# Patient Record
Sex: FEMALE | Race: WHITE | NOT HISPANIC OR LATINO | Employment: UNEMPLOYED | URBAN - METROPOLITAN AREA
[De-identification: names, ages, dates, MRNs, and addresses within clinical notes are randomized per-mention and may not be internally consistent; named-entity substitution may affect disease eponyms.]

---

## 2017-03-08 ENCOUNTER — APPOINTMENT (OUTPATIENT)
Dept: WOMENS IMAGING | Facility: HOSPITAL | Age: 62
End: 2017-03-08

## 2017-03-08 PROCEDURE — 77067 SCR MAMMO BI INCL CAD: CPT | Performed by: RADIOLOGY

## 2017-11-15 ENCOUNTER — OFFICE VISIT (OUTPATIENT)
Dept: OBSTETRICS AND GYNECOLOGY | Facility: CLINIC | Age: 62
End: 2017-11-15

## 2017-11-15 VITALS
DIASTOLIC BLOOD PRESSURE: 74 MMHG | WEIGHT: 199.4 LBS | HEIGHT: 64 IN | SYSTOLIC BLOOD PRESSURE: 120 MMHG | BODY MASS INDEX: 34.04 KG/M2

## 2017-11-15 DIAGNOSIS — M85.862 OSTEOPENIA OF LEFT LOWER LEG: ICD-10-CM

## 2017-11-15 DIAGNOSIS — Z01.419 ENCOUNTER FOR GYNECOLOGICAL EXAMINATION WITHOUT ABNORMAL FINDING: ICD-10-CM

## 2017-11-15 DIAGNOSIS — Z79.890 POST-MENOPAUSE ON HRT (HORMONE REPLACEMENT THERAPY): Primary | ICD-10-CM

## 2017-11-15 DIAGNOSIS — N81.6 RECTOCELE: ICD-10-CM

## 2017-11-15 PROCEDURE — 99396 PREV VISIT EST AGE 40-64: CPT | Performed by: OBSTETRICS & GYNECOLOGY

## 2017-11-15 RX ORDER — ESTRADIOL 0.5 MG/1
0.5 TABLET ORAL DAILY
Qty: 90 TABLET | Refills: 3 | Status: SHIPPED | OUTPATIENT
Start: 2017-11-15 | End: 2018-11-15

## 2017-11-15 NOTE — PROGRESS NOTES
Chief Complaint   Patient presents with   • Gynecologic Exam     needs order for mammogram   • Med Refbuddy Hahn is a 61 y.o. year old  presenting to be seen for her annual exam.This patient has had an LSH.  She has a history of a grade 1 rectocele which has been stable.  She has no difficulty expelling stool.  She takes estradiol, 0.5 mg daily and is asymptomatic.  She has no side effects on this medication.  She has a history of minimal osteopenia of the femoral neck with her spine and hip being normal.    SCREENING TESTS    Year 2012 2016 2017   Age                         PAP                         HPV high risk                         Mammogram     benign benign                   TEDDY score                         Breast MRI                         Lipids                         Vitamin D                         Colonoscopy                         DEXA  Frax (hip/any)    Min. osteopenia                     Ovarian Screen                             She exercises regularly: yes.  She wears her seat belt: yes.  She has concerns about domestic violence: no.  She has noticed changes in height: no    GYN screening history:  · Last mammogram: was done on approximately 3/9/2017 and the result was: Birads II (Benign findings).  · Last DEXA: was done on approximately 11/10/2015 and the results were: osteopenia of the left femoral neck.    No Additional Complaints Reported    The following portions of the patient's history were reviewed and updated as appropriate:vital signs and   She  does not have any pertinent problems on file.  She  has a past surgical history that includes d&c hysteroscopy; Hysterectomy Supracervical; and Thoracotomy.  Her family history is not on file.  She  reports that she has never smoked. She has never used smokeless tobacco. She reports that she drinks alcohol. She reports  "that she does not use illicit drugs.  Current Outpatient Prescriptions   Medication Sig Dispense Refill   • estradiol (ESTRACE) 0.5 MG tablet Take 1 tablet by mouth Daily. 90 tablet 3   • BOOSTRIX 5-2.5-18.5 injection      • fluconazole (DIFLUCAN) 150 MG tablet Take 1 tablet by mouth Every Other Day. 3 tablet 0     No current facility-administered medications for this visit.      She has No Known Allergies..    Review of Systems  A comprehensive review of systems was taken.  Constitutional: negative for fever, chills, activity change, appetite change, fatigue and unexpected weight change.  Respiratory: negative  Cardiovascular: negative  Gastrointestinal: negative  Genitourinary:negative  Musculoskeletal:positive for right hip pain  Behavioral/Psych: negative       /74  Ht 64\" (162.6 cm)  Wt 199 lb 6.4 oz (90.4 kg)  LMP  (LMP Unknown)  BMI 34.23 kg/m2    Physical Exam    General:  alert; cooperative; well developed; well nourished   Skin:  No suspicious lesions seen   Thyroid: normal to inspection and palpation   Lungs:  breathing is unlabored  clear to auscultation bilaterally   Heart:  systolic murmur: early systolic 2/6, low pitch and blowing at 2nd left intercostal space  Midline scar on chest   Breasts:  Examined in supine position  Symmetric without masses or skin dimpling  Nipples normal without inversion, lesions or discharge  There are no palpable axillary nodes   Abdomen: soft, non-tender; no masses  no umbilical or inginual hernias are present  no hepato-splenomegaly   Pelvis: Clinical staff was present for exam  External genitalia:  normal appearance of the external genitalia including Bartholin's and DeWitt's glands.  Vaginal:  normal pink mucosa without prolapse or lesions.  Cervix:  normal appearance.  Uterus:  absent.  Adnexa:  non palpable bilaterally.  Rectal:  anus visually normal appearing. recto-vaginal exam unremarkable and confirms findings;     Lab Review   No data " reviewed    Imaging  Mammogram results- benign, and DEXA- minimal osteopenia of the femoral neck with the spine and total hip normal         ASSESSMENT  Problems Addressed this Visit        Digestive    Rectocele       Musculoskeletal and Integument    Osteopenia       Genitourinary    Post-menopause on HRT (hormone replacement therapy) - Primary    Relevant Medications    estradiol (ESTRACE) 0.5 MG tablet      Other Visit Diagnoses     Encounter for gynecological examination without abnormal finding              PLAN    Medications prescribed this encounter:    New Medications Ordered This Visit   Medications   • estradiol (ESTRACE) 0.5 MG tablet     Sig: Take 1 tablet by mouth Daily.     Dispense:  90 tablet     Refill:  3   · DEXA in 2 years  · Calcium, 600 mg/ Vit. D, 400 IU daily; regular weight-bearing exercise  · Follow up: 12 month(s)  *Please note that portions of this documentation may have been completed with a voice recognition program.  Efforts were made to edit this dictation, but occasional words may have been mistranscribed.       This note was electronically signed.    BRIANA Dasilva MD  November 15, 2017  11:52 AM

## 2019-03-26 ENCOUNTER — APPOINTMENT (OUTPATIENT)
Dept: WOMENS IMAGING | Facility: HOSPITAL | Age: 64
End: 2019-03-26

## 2019-03-26 PROCEDURE — 77067 SCR MAMMO BI INCL CAD: CPT | Performed by: RADIOLOGY

## 2019-04-30 DIAGNOSIS — Z79.890 POST-MENOPAUSE ON HRT (HORMONE REPLACEMENT THERAPY): ICD-10-CM

## 2019-04-30 RX ORDER — ESTRADIOL 0.5 MG/1
TABLET ORAL
Qty: 90 TABLET | Refills: 3 | OUTPATIENT
Start: 2019-04-30